# Patient Record
Sex: FEMALE | Race: BLACK OR AFRICAN AMERICAN | URBAN - METROPOLITAN AREA
[De-identification: names, ages, dates, MRNs, and addresses within clinical notes are randomized per-mention and may not be internally consistent; named-entity substitution may affect disease eponyms.]

---

## 2022-04-19 ENCOUNTER — WEB ENCOUNTER (OUTPATIENT)
Dept: URBAN - METROPOLITAN AREA CLINIC 109 | Facility: CLINIC | Age: 47
End: 2022-04-19

## 2022-04-19 ENCOUNTER — LAB OUTSIDE AN ENCOUNTER (OUTPATIENT)
Dept: URBAN - METROPOLITAN AREA CLINIC 109 | Facility: CLINIC | Age: 47
End: 2022-04-19

## 2022-04-19 ENCOUNTER — OFFICE VISIT (OUTPATIENT)
Dept: URBAN - METROPOLITAN AREA CLINIC 109 | Facility: CLINIC | Age: 47
End: 2022-04-19
Payer: MEDICARE

## 2022-04-19 ENCOUNTER — DASHBOARD ENCOUNTERS (OUTPATIENT)
Age: 47
End: 2022-04-19

## 2022-04-19 VITALS
BODY MASS INDEX: 43.4 KG/M2 | HEIGHT: 69 IN | DIASTOLIC BLOOD PRESSURE: 79 MMHG | TEMPERATURE: 98.4 F | SYSTOLIC BLOOD PRESSURE: 114 MMHG | WEIGHT: 293 LBS | HEART RATE: 76 BPM

## 2022-04-19 DIAGNOSIS — I10 PRIMARY HYPERTENSION: ICD-10-CM

## 2022-04-19 DIAGNOSIS — E11.9 TYPE 2 DIABETES MELLITUS WITHOUT COMPLICATION, WITHOUT LONG-TERM CURRENT USE OF INSULIN: ICD-10-CM

## 2022-04-19 DIAGNOSIS — Z86.010 HISTORY OF COLON POLYPS: ICD-10-CM

## 2022-04-19 DIAGNOSIS — E66.01 MORBID OBESITY: ICD-10-CM

## 2022-04-19 PROBLEM — 238136002: Status: ACTIVE | Noted: 2022-04-19

## 2022-04-19 PROBLEM — 313436004: Status: ACTIVE | Noted: 2022-04-19

## 2022-04-19 PROBLEM — 59621000: Status: ACTIVE | Noted: 2022-04-19

## 2022-04-19 PROCEDURE — 99203 OFFICE O/P NEW LOW 30 MIN: CPT | Performed by: INTERNAL MEDICINE

## 2022-04-19 NOTE — HPI-TODAY'S VISIT:
The patient has been referred for surveillance colonoscopy.  She has  had adenomatous polyps on several studies since age 36.  Last study was  5 years ago. No FH of colon cancer. Denies change in bowel function, blood in the stool, abdominal pain or weight loss.   PMH notabl for HTN, DM, chronic pain, anxiety, HLD.

## 2022-05-19 PROBLEM — 428283002: Status: ACTIVE | Noted: 2022-04-19

## 2022-05-25 ENCOUNTER — OFFICE VISIT (OUTPATIENT)
Dept: URBAN - METROPOLITAN AREA SURGERY CENTER 23 | Facility: SURGERY CENTER | Age: 47
End: 2022-05-25

## 2022-06-01 ENCOUNTER — OFFICE VISIT (OUTPATIENT)
Dept: URBAN - METROPOLITAN AREA SURGERY CENTER 23 | Facility: SURGERY CENTER | Age: 47
End: 2022-06-01
Payer: MEDICARE

## 2022-06-01 ENCOUNTER — CLAIMS CREATED FROM THE CLAIM WINDOW (OUTPATIENT)
Dept: URBAN - METROPOLITAN AREA CLINIC 4 | Facility: CLINIC | Age: 47
End: 2022-06-01
Payer: MEDICARE

## 2022-06-01 DIAGNOSIS — Z86.010 ADENOMAS PERSONAL HISTORY OF COLONIC POLYPS: ICD-10-CM

## 2022-06-01 DIAGNOSIS — D12.3 BENIGN NEOPLASM OF TRANSVERSE COLON: ICD-10-CM

## 2022-06-01 DIAGNOSIS — D12.3 ADENOMA OF TRANSVERSE COLON: ICD-10-CM

## 2022-06-01 PROCEDURE — 88305 TISSUE EXAM BY PATHOLOGIST: CPT | Performed by: PATHOLOGY

## 2022-06-01 PROCEDURE — G8907 PT DOC NO EVENTS ON DISCHARG: HCPCS | Performed by: INTERNAL MEDICINE

## 2022-06-01 PROCEDURE — 45385 COLONOSCOPY W/LESION REMOVAL: CPT | Performed by: INTERNAL MEDICINE

## 2023-01-26 ENCOUNTER — TELEPHONE ENCOUNTER (OUTPATIENT)
Dept: URBAN - METROPOLITAN AREA CLINIC 6 | Facility: CLINIC | Age: 48
End: 2023-01-26